# Patient Record
Sex: MALE | Employment: FULL TIME | URBAN - NONMETROPOLITAN AREA
[De-identification: names, ages, dates, MRNs, and addresses within clinical notes are randomized per-mention and may not be internally consistent; named-entity substitution may affect disease eponyms.]

---

## 2024-07-29 ENCOUNTER — TELEPHONE (OUTPATIENT)
Dept: FAMILY MEDICINE CLINIC | Facility: CLINIC | Age: 49
End: 2024-07-29

## 2024-07-29 NOTE — TELEPHONE ENCOUNTER
Called patient to let him know we have available spaces before his apt with us. He says he prefers to keep his current appointment with Dr. Silva.

## 2024-07-29 NOTE — TELEPHONE ENCOUNTER
If patient returns call, patient may be scheduled with any Provider in [facility]. Please search by department as there are residents available. Please make patient aware if they are seen by a resident, they will also be seen by Dr. Silva. Thank you

## 2024-09-16 ENCOUNTER — TELEPHONE (OUTPATIENT)
Dept: FAMILY MEDICINE CLINIC | Facility: CLINIC | Age: 49
End: 2024-09-16

## 2024-09-16 NOTE — TELEPHONE ENCOUNTER
[id number]:  Called patient to reschedule today's appointment due to Dr. Silva being sick. She is canceling the afternoon schedule. Patient did not answer, left a voicemail to call the office at [phone] to reschedule.